# Patient Record
Sex: FEMALE | Race: WHITE | URBAN - METROPOLITAN AREA
[De-identification: names, ages, dates, MRNs, and addresses within clinical notes are randomized per-mention and may not be internally consistent; named-entity substitution may affect disease eponyms.]

---

## 2021-01-19 ENCOUNTER — OFFICE VISIT (OUTPATIENT)
Dept: ENT CLINIC | Age: 36
End: 2021-01-19
Payer: COMMERCIAL

## 2021-01-19 VITALS
DIASTOLIC BLOOD PRESSURE: 69 MMHG | HEART RATE: 78 BPM | SYSTOLIC BLOOD PRESSURE: 103 MMHG | TEMPERATURE: 97.7 F | BODY MASS INDEX: 25.27 KG/M2 | HEIGHT: 69 IN | WEIGHT: 170.6 LBS

## 2021-01-19 DIAGNOSIS — M77.9 STYLOHYOID TENDONITIS: Primary | ICD-10-CM

## 2021-01-19 PROCEDURE — 99203 OFFICE O/P NEW LOW 30 MIN: CPT | Performed by: OTOLARYNGOLOGY

## 2021-01-19 RX ORDER — NAPROXEN 500 MG/1
500 TABLET ORAL 2 TIMES DAILY WITH MEALS
Qty: 60 TABLET | Refills: 2 | Status: SHIPPED | OUTPATIENT
Start: 2021-01-19 | End: 2021-02-18

## 2021-01-19 RX ORDER — FAMOTIDINE 10 MG
10 TABLET ORAL 2 TIMES DAILY
COMMUNITY

## 2021-01-19 RX ORDER — BUSPIRONE HYDROCHLORIDE 10 MG/1
10 TABLET ORAL 3 TIMES DAILY
COMMUNITY

## 2021-01-19 SDOH — HEALTH STABILITY: MENTAL HEALTH: HOW MANY STANDARD DRINKS CONTAINING ALCOHOL DO YOU HAVE ON A TYPICAL DAY?: NOT ASKED

## 2021-01-19 SDOH — HEALTH STABILITY: MENTAL HEALTH: HOW OFTEN DO YOU HAVE A DRINK CONTAINING ALCOHOL?: NOT ASKED

## 2021-01-19 NOTE — PROGRESS NOTES
CHIEF COMPLAINT: Globus sensation    HISTORY OF PRESENT ILLNESS:  38 y.o. female who presents with 7 months of a globus sensation. Right sided, submandibular. Feels tight. Relaxes when she eats or drinks. No pain. Feels some tightness under her right ear. No hoarseness, no dysphagia. PAST MEDICAL HISTORY:   Social History     Tobacco Use   Smoking Status Never Smoker   Smokeless Tobacco Never Used                                                    Social History     Substance and Sexual Activity   Alcohol Use Yes    Comment: occasionally                                                  No current outpatient medications on file. No past medical history on file. No past surgical history on file. FAMILY HISTORY: Family history reviewed. Except as noted in history of present illness, there is no pertinent family history      REVIEW OF SYSTEMS:  All pertinent positive and negative review of systems included in HPI. Otherwise, all systems are reviewed and negative. PHYSICAL EXAMINATION:   GENERAL: wdwn- no acute distress  RESPIRATORY:  No stridor or respiratory distress  COMMUNICATION :  Normal voice  MENTAL STATUS:  Mood and affect normal, oriented X 3  HEAD AND FACE:  No abnormalities of the skin of face or head  EXTERNAL EARS AND NOSE:  Normal pinnae bilateral  FACIAL MUSCLES:  All branches of facial nerve intact  EXTRAOCULAR MUSCLES: Intact with full range of motion  FACE PALPATION:  No tenderness over sinuses. Zygomatic arches and orbital rims intact  OTOSCOPY:  Normal external auditory canals, tympanic membranes, and middle ear spaces  TUNING FORKS: Rinne ++ Best midline at 512 Hz  INTRANASAL:  Septum midline, turbinates normal, meati clear.   LIPS, TEETH, GINGIVA:  Normal mucosa  PHARYNX:  Normal NECK:  No masses. Palpation of the right cornua of the hyoid bone reproduces the sensation as does palpation of the styloid process of the mastoid on that side. LYMPHATIC:  No cervical adenopathy  SALIVARY GLANDS:  No swelling or masses in the parotid or submandibular salivary glands  THYROID:  No goiter or thyroid masses. IMPRESSION: Stylohyoid tendinitis    PLAN: Floxin 500 mg prescribed to be taken twice daily with meals. FOLLOW-UP: As needed.

## 2024-03-13 LAB
ABO GROUPING: NORMAL
ALBUMIN SERPL-MCNC: 3.7 G/DL (ref 3.5–5.7)
ALBUMIN SERPL-MCNC: 4.2 G/DL (ref 3.5–5.7)
ANION GAP SERPL CALCULATED.3IONS-SCNC: 11 MMOL/L (ref 7–16)
ANION GAP SERPL CALCULATED.3IONS-SCNC: 8 MMOL/L (ref 7–16)
BASOPHILS ABSOLUTE: 0 K/UL (ref 0–0.1)
BASOPHILS RELATIVE PERCENT: 0.5 %
BUN BLDV-MCNC: 10 MG/DL (ref 7–25)
BUN BLDV-MCNC: 9 MG/DL (ref 7–25)
CALCIUM SERPL-MCNC: 9.1 MG/DL (ref 8.6–10.2)
CALCIUM SERPL-MCNC: 9.3 MG/DL (ref 8.6–10.2)
CHLORIDE BLD-SCNC: 101 MMOL/L (ref 98–107)
CHLORIDE BLD-SCNC: 102 MMOL/L (ref 98–107)
CO2: 24 MMOL/L (ref 21–31)
CO2: 25 MMOL/L (ref 21–31)
CREAT SERPL-MCNC: 0.81 MG/DL (ref 0.6–1.2)
CREAT SERPL-MCNC: 0.88 MG/DL (ref 0.6–1.2)
EGFR FEMALE: 86 ML/MIN/1.73M2
EGFR FEMALE: >90 ML/MIN/1.73M2
EOSINOPHILS ABSOLUTE: 0 K/UL (ref 0–0.4)
EOSINOPHILS RELATIVE PERCENT: 0.5 %
GLUCOSE BLD-MCNC: 96 MG/DL (ref 74–109)
GLUCOSE BLD-MCNC: 97 MG/DL (ref 74–109)
HCT VFR BLD CALC: 37.7 % (ref 35.8–46.5)
HCT VFR BLD CALC: 38.3 % (ref 35.8–46.5)
HCT VFR BLD CALC: 40.6 % (ref 35.8–46.5)
HEMOGLOBIN: 12.8 G/DL (ref 12.1–15.8)
HEMOGLOBIN: 13.1 G/DL (ref 12.1–15.8)
HEMOGLOBIN: 13.7 G/DL (ref 12.1–15.8)
LACTIC ACID: 1.6 MMOL/L (ref 0.5–2)
LYMPHOCYTES ABSOLUTE: 1.9 K/UL (ref 0.8–3.6)
LYMPHOCYTES RELATIVE PERCENT: 23.1 %
MCH RBC QN AUTO: 31.8 PG (ref 28.4–33.4)
MCHC RBC AUTO-ENTMCNC: 34.1 G/DL (ref 31.1–37)
MCV RBC AUTO: 93.3 FL (ref 85–99)
MONOCYTES ABSOLUTE: 0.8 K/UL (ref 0.3–0.9)
MONOCYTES RELATIVE PERCENT: 9.6 %
NEUTROPHILS ABSOLUTE: 5.5 K/UL (ref 2–7.3)
NEUTROPHILS RELATIVE PERCENT: 66.3 %
PDW BLD-RTO: 13.6 % (ref 11.7–15.2)
PHOSPHORUS: 2.8 MG/DL (ref 2.5–5)
PHOSPHORUS: 3.3 MG/DL (ref 2.5–5)
PLATELET # BLD: 267 K/UL (ref 154–393)
POTASSIUM SERPL-SCNC: 3.8 MMOL/L (ref 3.5–5.1)
POTASSIUM SERPL-SCNC: 3.8 MMOL/L (ref 3.5–5.1)
PREGNANCY, URINE: NEGATIVE
RBC # BLD: 4.11 M/UL (ref 3.86–5.17)
RH FACTOR: POSITIVE
SODIUM BLD-SCNC: 134 MMOL/L (ref 136–145)
SODIUM BLD-SCNC: 137 MMOL/L (ref 136–145)
WBC # BLD: 8.3 K/UL (ref 4–10.5)